# Patient Record
Sex: MALE | Race: WHITE | NOT HISPANIC OR LATINO | Employment: FULL TIME | URBAN - METROPOLITAN AREA
[De-identification: names, ages, dates, MRNs, and addresses within clinical notes are randomized per-mention and may not be internally consistent; named-entity substitution may affect disease eponyms.]

---

## 2019-06-20 PROBLEM — R03.0 ELEVATED BLOOD PRESSURE READING: Status: ACTIVE | Noted: 2019-06-20

## 2019-06-20 PROBLEM — R39.11 BENIGN PROSTATIC HYPERPLASIA WITH URINARY HESITANCY: Status: ACTIVE | Noted: 2019-06-20

## 2019-06-20 PROBLEM — N40.1 BENIGN PROSTATIC HYPERPLASIA WITH URINARY HESITANCY: Status: ACTIVE | Noted: 2019-06-20

## 2019-06-20 PROBLEM — J45.20 MILD INTERMITTENT ASTHMA WITHOUT COMPLICATION: Status: ACTIVE | Noted: 2019-06-20

## 2019-07-01 PROBLEM — M19.90 ARTHRITIS: Status: ACTIVE | Noted: 2019-07-01

## 2019-09-25 PROBLEM — R97.20 ELEVATED PSA: Status: ACTIVE | Noted: 2019-09-25

## 2019-09-25 PROBLEM — R35.0 FREQUENCY OF MICTURITION: Status: ACTIVE | Noted: 2019-09-25

## 2019-12-30 ENCOUNTER — TELEPHONE (OUTPATIENT)
Dept: RHEUMATOLOGY | Facility: MEDICAL CENTER | Age: 64
End: 2019-12-30

## 2019-12-30 NOTE — TELEPHONE ENCOUNTER
I have called and gotten approval for the MR pelvis     He can have it done at [facility], he should give them order number [id number] and is approved until January 28, 2020.     Rene or Rebecca, please call and notify patient of this ASAP so he can schedule at his convenience.

## 2020-01-08 ENCOUNTER — TELEPHONE (OUTPATIENT)
Dept: RHEUMATOLOGY | Facility: MEDICAL CENTER | Age: 65
End: 2020-01-08

## 2020-01-08 NOTE — TELEPHONE ENCOUNTER
[facility] called and needs clarification from you in regards to the RX for Meloxicam. They want to make sure that you are also aware that pt is on Methotrexate. Please, call [facility] [phone]

## 2020-01-09 NOTE — TELEPHONE ENCOUNTER
Called and spoke with the pharmacy and clarified that it is okay to fill the methotrexate as prescribed

## 2020-09-17 PROBLEM — M16.11 PRIMARY OSTEOARTHRITIS OF RIGHT HIP: Status: ACTIVE | Noted: 2020-09-17

## 2021-02-17 PROBLEM — R07.9 PAIN IN THE CHEST: Status: ACTIVE | Noted: 2021-02-17

## 2021-02-18 PROBLEM — R07.9 PAIN IN THE CHEST: Status: RESOLVED | Noted: 2021-02-17 | Resolved: 2021-02-18

## 2021-07-01 PROBLEM — R33.9 URINARY RETENTION: Status: ACTIVE | Noted: 2021-07-01

## 2022-02-23 PROBLEM — R10.12 LUQ PAIN: Status: ACTIVE | Noted: 2022-02-23

## 2022-02-23 PROBLEM — R33.9 URINARY RETENTION: Status: RESOLVED | Noted: 2021-07-01 | Resolved: 2022-02-23

## 2022-02-23 PROBLEM — R03.0 ELEVATED BLOOD PRESSURE READING: Status: RESOLVED | Noted: 2019-06-20 | Resolved: 2022-02-23

## 2022-02-23 PROBLEM — R35.0 FREQUENCY OF MICTURITION: Status: RESOLVED | Noted: 2019-09-25 | Resolved: 2022-02-23

## 2022-02-23 PROBLEM — I10 PRIMARY HYPERTENSION: Status: ACTIVE | Noted: 2022-02-23

## 2022-02-23 PROBLEM — D36.9 TUBULAR ADENOMA: Status: ACTIVE | Noted: 2022-02-23

## 2022-02-23 PROBLEM — M16.11 PRIMARY OSTEOARTHRITIS OF RIGHT HIP: Status: RESOLVED | Noted: 2020-09-17 | Resolved: 2022-02-23

## 2022-02-23 PROBLEM — E78.5 HYPERLIPIDEMIA: Status: ACTIVE | Noted: 2022-02-23

## 2022-04-12 PROBLEM — K76.0 FATTY LIVER: Status: ACTIVE | Noted: 2022-04-12

## 2022-12-20 PROBLEM — J45.41 MODERATE PERSISTENT ASTHMA WITH ACUTE EXACERBATION: Status: ACTIVE | Noted: 2019-06-20

## 2023-03-06 PROBLEM — M65.30 TRIGGER FINGER: Status: ACTIVE | Noted: 2023-03-06
